# Patient Record
Sex: FEMALE | Race: WHITE | NOT HISPANIC OR LATINO
[De-identification: names, ages, dates, MRNs, and addresses within clinical notes are randomized per-mention and may not be internally consistent; named-entity substitution may affect disease eponyms.]

---

## 2017-07-17 ENCOUNTER — RX RENEWAL (OUTPATIENT)
Age: 62
End: 2017-07-17

## 2017-07-19 ENCOUNTER — MEDICATION RENEWAL (OUTPATIENT)
Age: 62
End: 2017-07-19

## 2017-08-04 ENCOUNTER — APPOINTMENT (OUTPATIENT)
Dept: CARDIOLOGY | Facility: CLINIC | Age: 62
End: 2017-08-04
Payer: COMMERCIAL

## 2017-08-04 ENCOUNTER — MEDICATION RENEWAL (OUTPATIENT)
Age: 62
End: 2017-08-04

## 2017-08-04 ENCOUNTER — NON-APPOINTMENT (OUTPATIENT)
Age: 62
End: 2017-08-04

## 2017-08-04 VITALS
SYSTOLIC BLOOD PRESSURE: 115 MMHG | BODY MASS INDEX: 24.98 KG/M2 | WEIGHT: 141 LBS | HEART RATE: 62 BPM | OXYGEN SATURATION: 95 % | HEIGHT: 63 IN | DIASTOLIC BLOOD PRESSURE: 62 MMHG

## 2017-08-04 PROCEDURE — 99215 OFFICE O/P EST HI 40 MIN: CPT

## 2017-08-04 PROCEDURE — 93000 ELECTROCARDIOGRAM COMPLETE: CPT

## 2017-08-08 LAB
ALBUMIN SERPL ELPH-MCNC: 3.9 G/DL
ALP BLD-CCNC: 98 U/L
ALT SERPL-CCNC: 10 U/L
ANION GAP SERPL CALC-SCNC: 13 MMOL/L
AST SERPL-CCNC: 20 U/L
BASOPHILS # BLD AUTO: 0.02 K/UL
BASOPHILS NFR BLD AUTO: 0.3 %
BILIRUB SERPL-MCNC: 0.2 MG/DL
BUN SERPL-MCNC: 9 MG/DL
CALCIUM SERPL-MCNC: 9.2 MG/DL
CHLORIDE SERPL-SCNC: 105 MMOL/L
CHOLEST SERPL-MCNC: 233 MG/DL
CHOLEST/HDLC SERPL: 4.2 RATIO
CO2 SERPL-SCNC: 23 MMOL/L
CREAT SERPL-MCNC: 0.73 MG/DL
EOSINOPHIL # BLD AUTO: 0.16 K/UL
EOSINOPHIL NFR BLD AUTO: 2.4 %
GLUCOSE SERPL-MCNC: 84 MG/DL
HCT VFR BLD CALC: 39.1 %
HDLC SERPL-MCNC: 56 MG/DL
HGB BLD-MCNC: 13.2 G/DL
IMM GRANULOCYTES NFR BLD AUTO: 0.1 %
LDLC SERPL CALC-MCNC: 145 MG/DL
LYMPHOCYTES # BLD AUTO: 3.04 K/UL
LYMPHOCYTES NFR BLD AUTO: 44.7 %
MAN DIFF?: NORMAL
MCHC RBC-ENTMCNC: 30.3 PG
MCHC RBC-ENTMCNC: 33.8 GM/DL
MCV RBC AUTO: 89.9 FL
MONOCYTES # BLD AUTO: 0.43 K/UL
MONOCYTES NFR BLD AUTO: 6.3 %
NEUTROPHILS # BLD AUTO: 3.14 K/UL
NEUTROPHILS NFR BLD AUTO: 46.2 %
PLATELET # BLD AUTO: 345 K/UL
POTASSIUM SERPL-SCNC: 4.4 MMOL/L
PROT SERPL-MCNC: 7.2 G/DL
RBC # BLD: 4.35 M/UL
RBC # FLD: 13.2 %
SODIUM SERPL-SCNC: 141 MMOL/L
TRIGL SERPL-MCNC: 162 MG/DL
TSH SERPL-ACNC: 2.59 UIU/ML
WBC # FLD AUTO: 6.8 K/UL

## 2017-11-27 ENCOUNTER — MEDICATION RENEWAL (OUTPATIENT)
Age: 62
End: 2017-11-27

## 2018-09-04 ENCOUNTER — NON-APPOINTMENT (OUTPATIENT)
Age: 63
End: 2018-09-04

## 2018-09-04 ENCOUNTER — APPOINTMENT (OUTPATIENT)
Dept: CARDIOLOGY | Facility: CLINIC | Age: 63
End: 2018-09-04
Payer: COMMERCIAL

## 2018-09-04 VITALS
HEIGHT: 63 IN | HEART RATE: 61 BPM | WEIGHT: 137 LBS | BODY MASS INDEX: 24.27 KG/M2 | DIASTOLIC BLOOD PRESSURE: 75 MMHG | OXYGEN SATURATION: 93 % | SYSTOLIC BLOOD PRESSURE: 116 MMHG

## 2018-09-04 PROCEDURE — 93000 ELECTROCARDIOGRAM COMPLETE: CPT

## 2018-09-04 PROCEDURE — 99214 OFFICE O/P EST MOD 30 MIN: CPT

## 2018-09-05 LAB
ALBUMIN SERPL ELPH-MCNC: 4.2 G/DL
ALP BLD-CCNC: 101 U/L
ALT SERPL-CCNC: 13 U/L
ANION GAP SERPL CALC-SCNC: 13 MMOL/L
APPEARANCE: ABNORMAL
AST SERPL-CCNC: 19 U/L
BASOPHILS # BLD AUTO: 0.04 K/UL
BASOPHILS NFR BLD AUTO: 0.5 %
BILIRUB SERPL-MCNC: 0.2 MG/DL
BILIRUBIN URINE: NEGATIVE
BLOOD URINE: ABNORMAL
BUN SERPL-MCNC: 14 MG/DL
CALCIUM SERPL-MCNC: 10 MG/DL
CHLORIDE SERPL-SCNC: 102 MMOL/L
CHOLEST SERPL-MCNC: 231 MG/DL
CHOLEST/HDLC SERPL: 4.5 RATIO
CO2 SERPL-SCNC: 26 MMOL/L
COLOR: YELLOW
CREAT SERPL-MCNC: 0.67 MG/DL
EOSINOPHIL # BLD AUTO: 0.19 K/UL
EOSINOPHIL NFR BLD AUTO: 2.5 %
GLUCOSE QUALITATIVE U: NEGATIVE MG/DL
GLUCOSE SERPL-MCNC: 92 MG/DL
HBA1C MFR BLD HPLC: 5.6 %
HCT VFR BLD CALC: 40.5 %
HDLC SERPL-MCNC: 51 MG/DL
HGB BLD-MCNC: 13.3 G/DL
IMM GRANULOCYTES NFR BLD AUTO: 0.1 %
KETONES URINE: NEGATIVE
LDLC SERPL CALC-MCNC: 152 MG/DL
LEUKOCYTE ESTERASE URINE: ABNORMAL
LYMPHOCYTES # BLD AUTO: 3.52 K/UL
LYMPHOCYTES NFR BLD AUTO: 46 %
MAN DIFF?: NORMAL
MCHC RBC-ENTMCNC: 30.6 PG
MCHC RBC-ENTMCNC: 32.8 GM/DL
MCV RBC AUTO: 93.1 FL
MONOCYTES # BLD AUTO: 0.48 K/UL
MONOCYTES NFR BLD AUTO: 6.3 %
NEUTROPHILS # BLD AUTO: 3.41 K/UL
NEUTROPHILS NFR BLD AUTO: 44.6 %
NITRITE URINE: POSITIVE
PH URINE: 6
PLATELET # BLD AUTO: 365 K/UL
POTASSIUM SERPL-SCNC: 4.7 MMOL/L
PROT SERPL-MCNC: 7.7 G/DL
PROTEIN URINE: NEGATIVE MG/DL
RBC # BLD: 4.35 M/UL
RBC # FLD: 13.3 %
SODIUM SERPL-SCNC: 141 MMOL/L
SPECIFIC GRAVITY URINE: 1.01
TRIGL SERPL-MCNC: 139 MG/DL
TSH SERPL-ACNC: 3.92 UIU/ML
UROBILINOGEN URINE: NEGATIVE MG/DL
WBC # FLD AUTO: 7.65 K/UL

## 2018-09-27 ENCOUNTER — APPOINTMENT (OUTPATIENT)
Dept: CARDIOLOGY | Facility: CLINIC | Age: 63
End: 2018-09-27
Payer: COMMERCIAL

## 2018-09-27 PROCEDURE — 93306 TTE W/DOPPLER COMPLETE: CPT

## 2018-10-11 ENCOUNTER — RX RENEWAL (OUTPATIENT)
Age: 63
End: 2018-10-11

## 2018-10-29 ENCOUNTER — RX RENEWAL (OUTPATIENT)
Age: 63
End: 2018-10-29

## 2019-10-22 ENCOUNTER — RX RENEWAL (OUTPATIENT)
Age: 64
End: 2019-10-22

## 2019-10-25 ENCOUNTER — NON-APPOINTMENT (OUTPATIENT)
Age: 64
End: 2019-10-25

## 2019-10-25 ENCOUNTER — APPOINTMENT (OUTPATIENT)
Dept: CARDIOLOGY | Facility: CLINIC | Age: 64
End: 2019-10-25
Payer: COMMERCIAL

## 2019-10-25 VITALS
DIASTOLIC BLOOD PRESSURE: 69 MMHG | WEIGHT: 131 LBS | HEART RATE: 65 BPM | OXYGEN SATURATION: 95 % | SYSTOLIC BLOOD PRESSURE: 99 MMHG | BODY MASS INDEX: 23.21 KG/M2 | HEIGHT: 63 IN

## 2019-10-25 PROCEDURE — 93000 ELECTROCARDIOGRAM COMPLETE: CPT

## 2019-10-25 PROCEDURE — 99214 OFFICE O/P EST MOD 30 MIN: CPT

## 2019-10-25 NOTE — HISTORY OF PRESENT ILLNESS
[FreeTextEntry1] : Daisy Worley presented to the office for a follow up evaluation. She was last seen in the office about one year ago.\par \par She is now 64 years old, with a history of paroxysmal SVT, with some associated chest discomfort, for which she was initially evaluated at [facility] in November of 2010. She's been treated with a beta blocker, and overall has done well.  Because of some increased symptoms, I had temporarily increased her metoprolol to 50 mg twice daily. This did not improve her symptoms, and lower her blood pressure, such that her dose was reduced again.\par \par She continues to do well from a cardiovascular perspective. She reports no chest discomfort or shortness of breath. She denies orthopnea, PND or lower extremity edema. Over the past year, she has had brief SVT. She reports that they last about 15 seconds or so, and are well tolerated.  She denies dizziness and syncope. She still suffers from significant tremors in her head, improved with baclofen.   \par

## 2019-10-25 NOTE — DISCUSSION/SUMMARY
[FreeTextEntry1] : Daisy has a history of symptomatic SVT.  In general, her episodes are rare and well-tolerated. \par \par I explained that as long as her symptoms remain infrequent, brief and well tolerated, we can continue her present medications. If there is any change in her symptoms, she will give me a call.\par \par She needs bloodwork for her cholesterol which has not been great.  She will get this done with her pcp.\par \par I would suggest a followup in one year.

## 2019-10-25 NOTE — PHYSICAL EXAM
[General Appearance - Well Developed] : well developed [Normal Appearance] : normal appearance [Well Groomed] : well groomed [General Appearance - Well Nourished] : well nourished [No Deformities] : no deformities [General Appearance - In No Acute Distress] : no acute distress [Normal Conjunctiva] : the conjunctiva exhibited no abnormalities [Eyelids - No Xanthelasma] : the eyelids demonstrated no xanthelasmas [Normal Oral Mucosa] : normal oral mucosa [No Oral Pallor] : no oral pallor [No Oral Cyanosis] : no oral cyanosis [Normal Jugular Venous A Waves Present] : normal jugular venous A waves present [Normal Jugular Venous V Waves Present] : normal jugular venous V waves present [No Jugular Venous Jesus A Waves] : no jugular venous jesus A waves [Respiration, Rhythm And Depth] : normal respiratory rhythm and effort [Exaggerated Use Of Accessory Muscles For Inspiration] : no accessory muscle use [Abdomen Soft] : soft [Auscultation Breath Sounds / Voice Sounds] : lungs were clear to auscultation bilaterally [Abdomen Tenderness] : non-tender [Abdomen Mass (___ Cm)] : no abdominal mass palpated [Gait - Sufficient For Exercise Testing] : the gait was sufficient for exercise testing [Abnormal Walk] : normal gait [Nail Clubbing] : no clubbing of the fingernails [Cyanosis, Localized] : no localized cyanosis [Petechial Hemorrhages (___cm)] : no petechial hemorrhages [] : no rash [Skin Color & Pigmentation] : normal skin color and pigmentation [No Venous Stasis] : no venous stasis [Skin Lesions] : no skin lesions [No Skin Ulcers] : no skin ulcer [Oriented To Time, Place, And Person] : oriented to person, place, and time [No Xanthoma] : no  xanthoma was observed [Affect] : the affect was normal [Mood] : the mood was normal [No Anxiety] : not feeling anxious [Normal] : normal [Rhythm Regular] : regular [Normal Rate] : normal [Normal S1] : normal S1 [Normal S2] : normal S2 [No Gallop] : no gallop heard [S3] : no S3 [S4] : no S4 [No Murmur] : no murmurs heard [Left Carotid Bruit] : no bruit heard over the left carotid [Right Carotid Bruit] : no bruit heard over the right carotid [Left Femoral Bruit] : no bruit heard over the left femoral artery [Right Femoral Bruit] : no bruit heard over the right femoral artery [2+] : right 2+ [Bruit] : no bruit heard [No Pitting Edema] : no pitting edema present [Lt] : no varicose veins of the left leg [Rt] : no varicose veins of the right leg

## 2019-10-25 NOTE — CARDIOLOGY SUMMARY
[___] : [unfilled] [No Exercise Ind Arr] : no exercise induced arrhythmias [No Ischemia] : no Ischemia [No Symptoms] : no Symptoms [LVEF ___%] : LVEF [unfilled]% [None] : no pulmonary hypertension [Normal] : normal LA size [Mild] : mild mitral regurgitation

## 2020-11-12 ENCOUNTER — NON-APPOINTMENT (OUTPATIENT)
Age: 65
End: 2020-11-12

## 2020-11-12 ENCOUNTER — APPOINTMENT (OUTPATIENT)
Dept: CARDIOLOGY | Facility: CLINIC | Age: 65
End: 2020-11-12
Payer: COMMERCIAL

## 2020-11-12 VITALS
SYSTOLIC BLOOD PRESSURE: 117 MMHG | OXYGEN SATURATION: 95 % | HEIGHT: 63 IN | HEART RATE: 52 BPM | WEIGHT: 134 LBS | BODY MASS INDEX: 23.74 KG/M2 | DIASTOLIC BLOOD PRESSURE: 70 MMHG

## 2020-11-12 PROCEDURE — 99214 OFFICE O/P EST MOD 30 MIN: CPT

## 2020-11-12 PROCEDURE — 99072 ADDL SUPL MATRL&STAF TM PHE: CPT

## 2020-11-12 PROCEDURE — 93000 ELECTROCARDIOGRAM COMPLETE: CPT

## 2020-11-12 RX ORDER — BACLOFEN 20 MG/1
20 TABLET ORAL TWICE DAILY
Refills: 0 | Status: ACTIVE | COMMUNITY

## 2020-11-12 NOTE — DISCUSSION/SUMMARY
[FreeTextEntry1] : Daisy has a history of symptomatic SVT.  In general, her episodes are rare and well-tolerated. \par \par I explained that as long as her symptoms remain infrequent, brief and well tolerated, we can continue her present medications. If there is any change in her symptoms, she will give me a call.\par \par She needs bloodwork for her cholesterol which has not been great.  She will get this done today.\par \par I would suggest a followup in one year. I would consider another echo at that time.

## 2020-11-12 NOTE — CARDIOLOGY SUMMARY
[___] : [unfilled] [No Ischemia] : no Ischemia [No Exercise Ind Arr] : no exercise induced arrhythmias [No Symptoms] : no Symptoms [LVEF ___%] : LVEF [unfilled]% [None] : no pulmonary hypertension [Normal] : normal LA size [Mild] : mild mitral regurgitation

## 2020-11-12 NOTE — PHYSICAL EXAM
[General Appearance - Well Developed] : well developed [Normal Appearance] : normal appearance [Well Groomed] : well groomed [General Appearance - Well Nourished] : well nourished [No Deformities] : no deformities [General Appearance - In No Acute Distress] : no acute distress [Normal Conjunctiva] : the conjunctiva exhibited no abnormalities [Eyelids - No Xanthelasma] : the eyelids demonstrated no xanthelasmas [Normal Oral Mucosa] : normal oral mucosa [No Oral Pallor] : no oral pallor [No Oral Cyanosis] : no oral cyanosis [Normal Jugular Venous A Waves Present] : normal jugular venous A waves present [Normal Jugular Venous V Waves Present] : normal jugular venous V waves present [No Jugular Venous Jesus A Waves] : no jugular venous jesus A waves [Respiration, Rhythm And Depth] : normal respiratory rhythm and effort [Exaggerated Use Of Accessory Muscles For Inspiration] : no accessory muscle use [Auscultation Breath Sounds / Voice Sounds] : lungs were clear to auscultation bilaterally [Abdomen Soft] : soft [Abdomen Tenderness] : non-tender [Abdomen Mass (___ Cm)] : no abdominal mass palpated [Abnormal Walk] : normal gait [Gait - Sufficient For Exercise Testing] : the gait was sufficient for exercise testing [Nail Clubbing] : no clubbing of the fingernails [Cyanosis, Localized] : no localized cyanosis [Petechial Hemorrhages (___cm)] : no petechial hemorrhages [Skin Color & Pigmentation] : normal skin color and pigmentation [] : no rash [No Venous Stasis] : no venous stasis [Skin Lesions] : no skin lesions [No Skin Ulcers] : no skin ulcer [No Xanthoma] : no  xanthoma was observed [Oriented To Time, Place, And Person] : oriented to person, place, and time [Affect] : the affect was normal [Mood] : the mood was normal [No Anxiety] : not feeling anxious [Normal] : normal [Normal Rate] : normal [Rhythm Regular] : regular [Normal S1] : normal S1 [Normal S2] : normal S2 [No Gallop] : no gallop heard [No Murmur] : no murmurs heard [2+] : left 2+ [No Pitting Edema] : no pitting edema present [S3] : no S3 [S4] : no S4 [Right Carotid Bruit] : no bruit heard over the right carotid [Left Carotid Bruit] : no bruit heard over the left carotid [Right Femoral Bruit] : no bruit heard over the right femoral artery [Left Femoral Bruit] : no bruit heard over the left femoral artery [Bruit] : no bruit heard [Rt] : no varicose veins of the right leg [Lt] : no varicose veins of the left leg

## 2020-11-12 NOTE — HISTORY OF PRESENT ILLNESS
[FreeTextEntry1] : Daisy Worley presented to the office for a follow up evaluation. She was last seen in the office about one year ago.\par \par She is now 65 years old, with a history of paroxysmal SVT, with some associated chest discomfort, for which she was initially evaluated at [facility] in November of 2010. She's been treated with a beta blocker, and overall has done well.  Because of some increased symptoms, I had temporarily increased her metoprolol to 50 mg twice daily. This did not improve her symptoms, and lowered her blood pressure, such that her dose was reduced again.\par \par She continues to do well from a cardiovascular perspective. She reports no chest discomfort or shortness of breath. She denies orthopnea, PND or lower extremity edema. Over the past year, she has a single episode of brief SVT. She reports that it lasted about 15 seconds or so, and was well tolerated.  She denies dizziness and syncope. She still suffers from significant tremors in her head, despite treatment which was recently increased.\par

## 2020-11-16 LAB
25(OH)D3 SERPL-MCNC: 29.9 NG/ML
ALBUMIN SERPL ELPH-MCNC: 4.3 G/DL
ALP BLD-CCNC: 100 U/L
ALT SERPL-CCNC: 10 U/L
ANION GAP SERPL CALC-SCNC: 13 MMOL/L
AST SERPL-CCNC: 13 U/L
BASOPHILS # BLD AUTO: 0.05 K/UL
BASOPHILS NFR BLD AUTO: 0.8 %
BILIRUB SERPL-MCNC: 0.2 MG/DL
BUN SERPL-MCNC: 11 MG/DL
CALCIUM SERPL-MCNC: 9.6 MG/DL
CHLORIDE SERPL-SCNC: 105 MMOL/L
CHOLEST SERPL-MCNC: 240 MG/DL
CO2 SERPL-SCNC: 24 MMOL/L
CREAT SERPL-MCNC: 0.83 MG/DL
EOSINOPHIL # BLD AUTO: 0.16 K/UL
EOSINOPHIL NFR BLD AUTO: 2.6 %
ESTIMATED AVERAGE GLUCOSE: 108 MG/DL
GLUCOSE SERPL-MCNC: 78 MG/DL
HBA1C MFR BLD HPLC: 5.4 %
HCT VFR BLD CALC: 40.3 %
HDLC SERPL-MCNC: 55 MG/DL
HGB BLD-MCNC: 13.3 G/DL
IMM GRANULOCYTES NFR BLD AUTO: 0.2 %
LDLC SERPL CALC-MCNC: 147 MG/DL
LYMPHOCYTES # BLD AUTO: 2.67 K/UL
LYMPHOCYTES NFR BLD AUTO: 44.1 %
MAN DIFF?: NORMAL
MCHC RBC-ENTMCNC: 31.2 PG
MCHC RBC-ENTMCNC: 33 GM/DL
MCV RBC AUTO: 94.6 FL
MONOCYTES # BLD AUTO: 0.43 K/UL
MONOCYTES NFR BLD AUTO: 7.1 %
NEUTROPHILS # BLD AUTO: 2.73 K/UL
NEUTROPHILS NFR BLD AUTO: 45.2 %
NONHDLC SERPL-MCNC: 185 MG/DL
PLATELET # BLD AUTO: 287 K/UL
POTASSIUM SERPL-SCNC: 4.5 MMOL/L
PROT SERPL-MCNC: 6.9 G/DL
RBC # BLD: 4.26 M/UL
RBC # FLD: 13.1 %
SODIUM SERPL-SCNC: 141 MMOL/L
TRIGL SERPL-MCNC: 191 MG/DL
TSH SERPL-ACNC: 2.37 UIU/ML
WBC # FLD AUTO: 6.05 K/UL

## 2021-03-23 ENCOUNTER — RX RENEWAL (OUTPATIENT)
Age: 66
End: 2021-03-23

## 2021-11-08 ENCOUNTER — NON-APPOINTMENT (OUTPATIENT)
Age: 66
End: 2021-11-08

## 2021-11-08 ENCOUNTER — APPOINTMENT (OUTPATIENT)
Dept: CARDIOLOGY | Facility: CLINIC | Age: 66
End: 2021-11-08
Payer: COMMERCIAL

## 2021-11-08 VITALS
DIASTOLIC BLOOD PRESSURE: 65 MMHG | WEIGHT: 130 LBS | SYSTOLIC BLOOD PRESSURE: 103 MMHG | HEART RATE: 84 BPM | HEIGHT: 63 IN | OXYGEN SATURATION: 95 % | BODY MASS INDEX: 23.04 KG/M2

## 2021-11-08 PROCEDURE — 99214 OFFICE O/P EST MOD 30 MIN: CPT

## 2021-11-08 PROCEDURE — 93000 ELECTROCARDIOGRAM COMPLETE: CPT

## 2021-11-08 PROCEDURE — 99072 ADDL SUPL MATRL&STAF TM PHE: CPT

## 2021-11-08 NOTE — DISCUSSION/SUMMARY
[FreeTextEntry1] : Daisy has a history of symptomatic SVT.  In general, her episodes are rare and well-tolerated. \par \par I explained that as long as her symptoms remain infrequent, brief and well tolerated, we can continue her present medications. If there is any change in her symptoms, she will give me a call.\par \par She needs bloodwork for her cholesterol which has not been great.  She will get this done soon.  I have suggested repeating her echocardiogram to reevaluate mitral and aortic insufficiency.  She has not had a follow-up exam in about 3 years.  I will be in contact with her to discuss the results.\par

## 2021-11-08 NOTE — CARDIOLOGY SUMMARY
[___] : [unfilled] [No Ischemia] : no Ischemia [No Exercise Ind Arr] : no exercise induced arrhythmias [No Symptoms] : no Symptoms [___] : [unfilled] [LVEF ___%] : LVEF [unfilled]% [None] : no pulmonary hypertension [Normal] : normal LA size [Mild] : mild mitral regurgitation

## 2021-11-08 NOTE — HISTORY OF PRESENT ILLNESS
[FreeTextEntry1] : Daisy Worley presented to the office for a follow up evaluation. She was last seen in the office about one year ago.\par \par She is now 66 years old, with a history of paroxysmal SVT, with some associated chest discomfort, for which she was initially evaluated at [facility] in November of 2010. She's been treated with a beta blocker, and overall has done well.  Because of some increased symptoms, I had temporarily increased her metoprolol to 50 mg twice daily. This did not improve her symptoms, and lowered her blood pressure, such that her dose was reduced again.\par \par She continues to do well from a cardiovascular perspective. She reports no chest discomfort or shortness of breath. She denies orthopnea, PND or lower extremity edema. Over the past year, she cannot recall any episodes of brief SVT. She still suffers from significant tremors in her head, despite treatment.\par

## 2021-11-08 NOTE — PHYSICAL EXAM
[General Appearance - Well Developed] : well developed [Normal Appearance] : normal appearance [Well Groomed] : well groomed [General Appearance - Well Nourished] : well nourished [No Deformities] : no deformities [General Appearance - In No Acute Distress] : no acute distress [Normal Conjunctiva] : the conjunctiva exhibited no abnormalities [Eyelids - No Xanthelasma] : the eyelids demonstrated no xanthelasmas [Normal Oral Mucosa] : normal oral mucosa [No Oral Pallor] : no oral pallor [No Oral Cyanosis] : no oral cyanosis [Normal Jugular Venous A Waves Present] : normal jugular venous A waves present [Normal Jugular Venous V Waves Present] : normal jugular venous V waves present [No Jugular Venous Jesus A Waves] : no jugular venous jesus A waves [Respiration, Rhythm And Depth] : normal respiratory rhythm and effort [Auscultation Breath Sounds / Voice Sounds] : lungs were clear to auscultation bilaterally [Exaggerated Use Of Accessory Muscles For Inspiration] : no accessory muscle use [Abdomen Soft] : soft [Abdomen Tenderness] : non-tender [Abdomen Mass (___ Cm)] : no abdominal mass palpated [Abnormal Walk] : normal gait [Gait - Sufficient For Exercise Testing] : the gait was sufficient for exercise testing [Nail Clubbing] : no clubbing of the fingernails [Cyanosis, Localized] : no localized cyanosis [Petechial Hemorrhages (___cm)] : no petechial hemorrhages [Skin Color & Pigmentation] : normal skin color and pigmentation [] : no rash [No Venous Stasis] : no venous stasis [Skin Lesions] : no skin lesions [No Skin Ulcers] : no skin ulcer [No Xanthoma] : no  xanthoma was observed [Oriented To Time, Place, And Person] : oriented to person, place, and time [Affect] : the affect was normal [Mood] : the mood was normal [No Anxiety] : not feeling anxious [Normal] : normal [Normal Rate] : normal [Rhythm Regular] : regular [Normal S1] : normal S1 [Normal S2] : normal S2 [No Gallop] : no gallop heard [No Murmur] : no murmurs heard [2+] : left 2+ [No Pitting Edema] : no pitting edema present [S3] : no S3 [S4] : no S4 [Right Carotid Bruit] : no bruit heard over the right carotid [Left Carotid Bruit] : no bruit heard over the left carotid [Right Femoral Bruit] : no bruit heard over the right femoral artery [Left Femoral Bruit] : no bruit heard over the left femoral artery [Bruit] : no bruit heard [Rt] : no varicose veins of the right leg [Lt] : no varicose veins of the left leg

## 2021-11-18 ENCOUNTER — APPOINTMENT (OUTPATIENT)
Dept: CARDIOLOGY | Facility: CLINIC | Age: 66
End: 2021-11-18
Payer: COMMERCIAL

## 2021-11-18 PROCEDURE — 93306 TTE W/DOPPLER COMPLETE: CPT

## 2021-11-18 PROCEDURE — 99072 ADDL SUPL MATRL&STAF TM PHE: CPT

## 2021-11-22 LAB
25(OH)D3 SERPL-MCNC: 19.5 NG/ML
ALBUMIN SERPL ELPH-MCNC: 4.3 G/DL
ALP BLD-CCNC: 97 U/L
ALT SERPL-CCNC: 12 U/L
ANION GAP SERPL CALC-SCNC: 14 MMOL/L
AST SERPL-CCNC: 14 U/L
BASOPHILS # BLD AUTO: 0.07 K/UL
BASOPHILS NFR BLD AUTO: 1 %
BILIRUB SERPL-MCNC: 0.3 MG/DL
BUN SERPL-MCNC: 11 MG/DL
CALCIUM SERPL-MCNC: 9.7 MG/DL
CHLORIDE SERPL-SCNC: 106 MMOL/L
CHOLEST SERPL-MCNC: 227 MG/DL
CO2 SERPL-SCNC: 24 MMOL/L
CREAT SERPL-MCNC: 0.85 MG/DL
EOSINOPHIL # BLD AUTO: 0.12 K/UL
EOSINOPHIL NFR BLD AUTO: 1.8 %
ESTIMATED AVERAGE GLUCOSE: 105 MG/DL
GLUCOSE SERPL-MCNC: 76 MG/DL
HBA1C MFR BLD HPLC: 5.3 %
HCT VFR BLD CALC: 41.2 %
HDLC SERPL-MCNC: 60 MG/DL
HGB BLD-MCNC: 13.6 G/DL
IMM GRANULOCYTES NFR BLD AUTO: 0.3 %
LDLC SERPL CALC-MCNC: 131 MG/DL
LYMPHOCYTES # BLD AUTO: 3.27 K/UL
LYMPHOCYTES NFR BLD AUTO: 48.2 %
MAN DIFF?: NORMAL
MCHC RBC-ENTMCNC: 31.5 PG
MCHC RBC-ENTMCNC: 33 GM/DL
MCV RBC AUTO: 95.4 FL
MONOCYTES # BLD AUTO: 0.42 K/UL
MONOCYTES NFR BLD AUTO: 6.2 %
NEUTROPHILS # BLD AUTO: 2.89 K/UL
NEUTROPHILS NFR BLD AUTO: 42.5 %
NONHDLC SERPL-MCNC: 167 MG/DL
PLATELET # BLD AUTO: 309 K/UL
POTASSIUM SERPL-SCNC: 4.3 MMOL/L
PROT SERPL-MCNC: 6.9 G/DL
RBC # BLD: 4.32 M/UL
RBC # FLD: 13.2 %
SODIUM SERPL-SCNC: 143 MMOL/L
TRIGL SERPL-MCNC: 178 MG/DL
TSH SERPL-ACNC: 4.01 UIU/ML
WBC # FLD AUTO: 6.79 K/UL

## 2022-11-02 ENCOUNTER — RX RENEWAL (OUTPATIENT)
Age: 67
End: 2022-11-02

## 2023-02-13 ENCOUNTER — RX RENEWAL (OUTPATIENT)
Age: 68
End: 2023-02-13

## 2023-03-21 ENCOUNTER — RX RENEWAL (OUTPATIENT)
Age: 68
End: 2023-03-21

## 2023-05-05 ENCOUNTER — APPOINTMENT (OUTPATIENT)
Dept: CARDIOLOGY | Facility: CLINIC | Age: 68
End: 2023-05-05

## 2023-05-31 ENCOUNTER — APPOINTMENT (OUTPATIENT)
Dept: CARDIOLOGY | Facility: CLINIC | Age: 68
End: 2023-05-31

## 2023-07-05 ENCOUNTER — APPOINTMENT (OUTPATIENT)
Dept: CARDIOLOGY | Facility: CLINIC | Age: 68
End: 2023-07-05
Payer: MEDICARE

## 2023-07-05 ENCOUNTER — NON-APPOINTMENT (OUTPATIENT)
Age: 68
End: 2023-07-05

## 2023-07-05 VITALS
OXYGEN SATURATION: 100 % | SYSTOLIC BLOOD PRESSURE: 134 MMHG | HEART RATE: 56 BPM | HEIGHT: 63 IN | WEIGHT: 112 LBS | DIASTOLIC BLOOD PRESSURE: 74 MMHG | BODY MASS INDEX: 19.84 KG/M2

## 2023-07-05 PROCEDURE — 99214 OFFICE O/P EST MOD 30 MIN: CPT

## 2023-07-05 PROCEDURE — 93000 ELECTROCARDIOGRAM COMPLETE: CPT

## 2023-07-05 NOTE — PHYSICAL EXAM
[Normal Appearance] : normal appearance [General Appearance - Well Developed] : well developed [Well Groomed] : well groomed [General Appearance - Well Nourished] : well nourished [No Deformities] : no deformities [Normal Conjunctiva] : the conjunctiva exhibited no abnormalities [General Appearance - In No Acute Distress] : no acute distress [Eyelids - No Xanthelasma] : the eyelids demonstrated no xanthelasmas [Normal Oral Mucosa] : normal oral mucosa [No Oral Pallor] : no oral pallor [No Oral Cyanosis] : no oral cyanosis [Normal Jugular Venous A Waves Present] : normal jugular venous A waves present [Normal Jugular Venous V Waves Present] : normal jugular venous V waves present [No Jugular Venous Jesus A Waves] : no jugular venous jesus A waves [Respiration, Rhythm And Depth] : normal respiratory rhythm and effort [Exaggerated Use Of Accessory Muscles For Inspiration] : no accessory muscle use [Abdomen Soft] : soft [Auscultation Breath Sounds / Voice Sounds] : lungs were clear to auscultation bilaterally [Abdomen Tenderness] : non-tender [Abdomen Mass (___ Cm)] : no abdominal mass palpated [Abnormal Walk] : normal gait [Gait - Sufficient For Exercise Testing] : the gait was sufficient for exercise testing [Nail Clubbing] : no clubbing of the fingernails [Cyanosis, Localized] : no localized cyanosis [Petechial Hemorrhages (___cm)] : no petechial hemorrhages [Skin Color & Pigmentation] : normal skin color and pigmentation [] : no rash [No Venous Stasis] : no venous stasis [Skin Lesions] : no skin lesions [No Skin Ulcers] : no skin ulcer [No Xanthoma] : no  xanthoma was observed [Affect] : the affect was normal [Oriented To Time, Place, And Person] : oriented to person, place, and time [Mood] : the mood was normal [No Anxiety] : not feeling anxious [Normal] : normal [Rhythm Regular] : regular [Normal Rate] : normal [Normal S1] : normal S1 [Normal S2] : normal S2 [No Gallop] : no gallop heard [No Murmur] : no murmurs heard [2+] : left 2+ [No Pitting Edema] : no pitting edema present [S3] : no S3 [S4] : no S4 [Right Carotid Bruit] : no bruit heard over the right carotid [Left Carotid Bruit] : no bruit heard over the left carotid [Right Femoral Bruit] : no bruit heard over the right femoral artery [Left Femoral Bruit] : no bruit heard over the left femoral artery [Bruit] : no bruit heard [Rt] : no varicose veins of the right leg [Lt] : no varicose veins of the left leg

## 2023-07-05 NOTE — REASON FOR VISIT
12 lead EKG completed. Results handed to [facility].  Branden Tapia CET [Follow-Up - Clinic] : a clinic follow-up of [Supraventricular Tachycardia] : supraventricular tachycardia

## 2023-07-05 NOTE — DISCUSSION/SUMMARY
[FreeTextEntry1] : Daisy has a history of symptomatic SVT.  In general, her episodes are rare and well-tolerated. \par \par I explained that as long as her symptoms remain infrequent, brief and well tolerated, we can continue her present medications. If there is any change in her symptoms, she will give me a call.\par \par I reviewed her blood work from November 2021.  This revealed a normal hemoglobin A1c.  Total cholesterol was 227, with HDL 60, triglycerides 178 and calculated .  Echocardiography in November 2021 revealed a normal ejection fraction, with mild mitral regurgitation and aortic valve sclerosis.\par \par She should have repeat blood work.  She will get this done over the next few weeks.  I will be in contact with her to discuss the results.\par \par If all is well, she will see me in 1 month.

## 2023-07-05 NOTE — CARDIOLOGY SUMMARY
[___] : [unfilled] [No Ischemia] : no Ischemia [No Exercise Ind Arr] : no exercise induced arrhythmias [No Symptoms] : no Symptoms [LVEF ___%] : LVEF [unfilled]% [None] : no pulmonary hypertension [Mild] : mild mitral regurgitation [Normal] : normal LA size

## 2023-07-05 NOTE — HISTORY OF PRESENT ILLNESS
[FreeTextEntry1] : Daisy Worley presented to the office for a follow up evaluation. She was last seen in the office in November 2021.\par \par She is now 68 years old, with a history of paroxysmal SVT, with some associated chest discomfort, for which she was initially evaluated at [facility] in November of 2010. She's been treated with a beta blocker, and overall has done well.  Because of some increased symptoms, I had temporarily increased her metoprolol to 50 mg twice daily. This did not improve her symptoms, and lowered her blood pressure, such that her dose was reduced again.\par \par [place], and she has been enjoying things, especially the fireworks from yesterday. She continues to do well from a cardiovascular perspective. She reports no chest discomfort or shortness of breath. She denies orthopnea, PND or lower extremity edema. Since her last visit she can recall perhaps two episodes of SVT, lasting 30 seconds.  She cannot discern a trigger.  She may get lightheaded with it.  She sits down, takes a few deep breaths and feels better. She still suffers from significant tremors in her head, despite treatment.\par

## 2024-11-05 ENCOUNTER — APPOINTMENT (OUTPATIENT)
Dept: CARDIOLOGY | Facility: CLINIC | Age: 69
End: 2024-11-05
Payer: MEDICARE

## 2024-11-05 ENCOUNTER — NON-APPOINTMENT (OUTPATIENT)
Age: 69
End: 2024-11-05

## 2024-11-05 VITALS
BODY MASS INDEX: 23.04 KG/M2 | SYSTOLIC BLOOD PRESSURE: 113 MMHG | OXYGEN SATURATION: 95 % | WEIGHT: 130 LBS | HEART RATE: 69 BPM | HEIGHT: 63 IN | DIASTOLIC BLOOD PRESSURE: 74 MMHG

## 2024-11-05 DIAGNOSIS — I35.1 NONRHEUMATIC AORTIC (VALVE) INSUFFICIENCY: ICD-10-CM

## 2024-11-05 PROCEDURE — 99214 OFFICE O/P EST MOD 30 MIN: CPT

## 2024-11-05 PROCEDURE — G2211 COMPLEX E/M VISIT ADD ON: CPT

## 2024-11-05 PROCEDURE — 93000 ELECTROCARDIOGRAM COMPLETE: CPT
